# Patient Record
Sex: FEMALE | Race: WHITE | NOT HISPANIC OR LATINO | URBAN - METROPOLITAN AREA
[De-identification: names, ages, dates, MRNs, and addresses within clinical notes are randomized per-mention and may not be internally consistent; named-entity substitution may affect disease eponyms.]

---

## 2018-04-10 DIAGNOSIS — I82.409 DVT (DEEP VENOUS THROMBOSIS): Primary | ICD-10-CM

## 2018-04-18 DIAGNOSIS — M54.50 LOW BACK PAIN: Primary | ICD-10-CM

## 2018-04-18 DIAGNOSIS — M62.81 MUSCLE WEAKNESS (GENERALIZED): ICD-10-CM

## 2018-06-06 DIAGNOSIS — M54.50 CHRONIC MIDLINE LOW BACK PAIN: Primary | ICD-10-CM

## 2018-06-06 DIAGNOSIS — M62.81 MUSCLE WEAKNESS (GENERALIZED): ICD-10-CM

## 2018-06-06 DIAGNOSIS — G89.29 CHRONIC MIDLINE LOW BACK PAIN: Primary | ICD-10-CM

## 2018-06-25 ENCOUNTER — CLINICAL SUPPORT (OUTPATIENT)
Dept: REHABILITATION | Facility: HOSPITAL | Age: 73
End: 2018-06-25
Payer: COMMERCIAL

## 2018-06-25 DIAGNOSIS — Z91.81 RISK FOR FALLS: ICD-10-CM

## 2018-06-25 DIAGNOSIS — M54.50 CHRONIC BILATERAL LOW BACK PAIN WITHOUT SCIATICA: ICD-10-CM

## 2018-06-25 DIAGNOSIS — R29.898 DECONDITIONED LOW BACK: ICD-10-CM

## 2018-06-25 DIAGNOSIS — G89.29 CHRONIC BILATERAL LOW BACK PAIN WITHOUT SCIATICA: ICD-10-CM

## 2018-06-25 DIAGNOSIS — Z74.09 IMPAIRED FUNCTIONAL MOBILITY, BALANCE, GAIT, AND ENDURANCE: ICD-10-CM

## 2018-06-25 PROCEDURE — 97162 PT EVAL MOD COMPLEX 30 MIN: CPT | Mod: PN

## 2018-06-25 PROCEDURE — 97110 THERAPEUTIC EXERCISES: CPT | Mod: PN

## 2018-06-25 NOTE — PLAN OF CARE
[facility]  Physical Therapy Initial Evaluation    Name: Mary Pride  Clinic Number: [id number]    Therapy Diagnosis:   Encounter Diagnoses   Name Primary?    Impaired functional mobility, balance, gait, and endurance     Chronic bilateral low back pain without sciatica     Deconditioned low back     Risk for falls      Physician: Darby Mark MD    Physician Orders: PT Eval and Treat   Medical Diagnosis: Chronic LBP, muscle weakness  Evaluation Date: 6/25/2018  Authorization Period Expiration: 12/31/2018  Plan of Care Certification Period: 06/25/2018 to 08/19/2018  Visit # / Visits authorized: 1/50    Time In: 1700  Time Out: 1750  Total Billable Time: 50' minutes    Precautions: Standard and Fall    Subjective   Mary reports primary complaint of muscle weakness and 'having a hard time getting up and down'.  Reports that she has gotten weaker over the last 3 months due to multiple hospitalization since the new year.  This was at an OSH; no diagnosis available.  The patient states that the doctors could not find anything wrong with her except anemia.  Reports that she used to walk with SC but now has to use a rolling walker at all times.  However she states that she has not been walking due to the inability to stand up from her chair at home. She reports that she is able to drive but hasn't driven in 'several months'.   Does have a history of falls, the last one being 2 weeks ago.  Also reports dizziness with bending over which makes her fearful of falling.   Lives in a one-bedroom apartment.  Has her groceries delivered, has someone that cleans her house.  Able to dress herself.  Fearful of falling.  Reports inability to sleep; sleeps in a recliner due to LBP.   She presents today in a manual wheelchair and required assistance to transfer in/out of her family members car.        No past medical history on file. Reports orthostasis, B knee OA, chronic LBP, headaches, anemia  requiring transfusions  Mary Pride  has no past surgical history on file. Reports CECILIA Hernandez currently has no medications in their medication list.  See intake form.    Review of patient's allergies indicates:  Allergies not on file     Imaging: None available.    Prior Therapy: None  Social History:  lives alone in a ground level one-bedroom apartment.  Occupation: Works in the [employer] for [employer].  Prior Level of Function: Prior to January 1 she reports that she was walking with a SC.     Pain:  Current 7/10, worst 9/10, best 3/10   Location: bilateral back  and shoulder   Description: Deep and Sharp    Pts goals: To be able to walk and get in/out of her chair/car better so she can go back to work.  She is trying to achieve a 50 year [employer] kaur at her job.     Objective   Posture: Morbid obesity. Difficult to assess also due to inability.     Gross Movement Analysis:  - Gait:  Requires BUE support; trial of ambulation in the // bars.  Only able to walk 25' before requiring sitting rest break.   - Squats: NT  - Sit-to-Stand: Requires multi-attempts with min. Assist and BUE support of // bars. Leads with lumbar extension to generate the upward momentum to stand.  Decreas'd BLE control during return to sit.     Range of Motion:  Range of Motion:    Left Right   Shoulder Flexion P: 80    A: 80 P: 100     A:120   Shoulder abduction P: NT    A: NT P: NT     A: NT   Shoulder ER P: 30    A: 30 P: 40   A: 40   Shoulder IR P: 20    A: 20 P: 45    A: 45   Elbow WNL WNL   Wrist WNL WNL   L thumb trigger finger.      Upper Extremity Strength  UE Right Left   Shoulder flexion: 3-/5 3/5   Shoulder Abduction: NT NT   Shoulder ER 3+/5 3/5   Shoulder IR 3+/5 3-/5   Elbow flexion 3+/5 3+/5   Elbow extension 3+/5 3+/5   Wrist extension 3+/5 3+/5   Thumb extension 2/5 2/5   PADs/DABs 3/5 3/5     LE Right Left   Hip flexion 90 90   Hip abduction NT NT   Hip ER 30 WNL   Hip IR  30 30   Hip extension NT NT   Knee 0 - 115 0 - 115    Ankle DF 5 5     Lower Extremity Strength                 LE           Right           Left   Hip flexion: 3-/5 3-/5   Hip abduction 3 3   Hip extension 3 3   Hip ER 3 3   Knee flexion 3+ 3+   Knee extension 3 to 3+ 3 to 3+   Ankle dorsiflexion: 4 4   Ankle plantarflexion: 1/5 1/5     Special Tests: NT  Joint Mobility: significant L shoulder arthrofibrosis.  Sensation: intact light touch sensation to BLE throughout L2-S2 dermatomal pattern  Flexibility:  Tight hip flexors and gastrocnemius B.     CMS Impairment/Limitation/Restriction for FOTO Knee Survey    Therapist reviewed FOTO scores for Mary Pride on 6/25/2018.   FOTO documents entered into [employer] - see Media section.    Limitation Score: 62%  Predicted Score: 48%       TREATMENT   Treatment Time In: 1745  Treatment Time Out: 1800  Total Treatment time separate from Evaluation time:15'    Mary received therapeutic exercises to develop strength for 15 minutes with PT 1:1  DATE 6/25/2018   VISIT 1   FOTO 1/5   Cap Visit  Cap Total        Seated hip marching 2x10   Seated LAQs 2x10   Seated heel raises 2x10           INITIALS JH     Home Exercises and Patient Education Provided  Education provided re: performance of seated marching, seated knee extensions, and seated heel raises at home 10-20 per hour.  Written Home Exercises Provided: will provide at next session.    Mary demonstrated good  understanding of the education provided.     Assessment   Mary is a 73 y.o. female referred to outpatient Physical Therapy with a medical diagnosis of chronic LBP and muscle weakness. Pt does have chronic LBP but this is stable without an acute flare-up at this time.  She is however very debilitated due to a combination of reasons including multiple hospitalizations this year with a prior level of function that was a low MET level at baseline, morbid obesity, and multiple orthopaedic issues including B hip OA with history of L ANA, B shoulder RTC dysfunction, and  B knee OA making it difficult for her to perform functional transfers and community ambulation.  At baseline, her tolerance to activity and CVP capacity is low.  She is fearful of falling.  She also declines attempts from friends/family to aid her due to not wanting to burden them with her care.  She reports abilities to perform ADLs including bathing without issue, but based on her lack of shoulder mobility/AROM alone this seem unlikely.  Mrs. Butt would benefit greatly from CVP conditioning, transfer training, and BLE/BUE strengthening to improve her functional mobility and activity tolerance baseline.      Pt prognosis is Good.   Pt will benefit from skilled outpatient Physical Therapy to address the deficits stated above and in the chart below, provide pt/family education, and to maximize pt's level of independence.     Plan of care discussed with patient: Yes  Pt's spiritual, cultural and educational needs considered and patient is agreeable to the plan of care and goals as stated below:     Anticipated Barriers for therapy: mobility, low tolerance to activity, co-morbidities.    Medical Necessity is demonstrated by the following  History  Co-morbidities and personal factors that may impact the plan of care Co-morbidities:   high BMI, HTN and prior hip surgery    Personal Factors:   age  lifestyle  attitudes     moderate   Examination  Body Structures and Functions, activity limitations and participation restrictions that may impact the plan of care Body Regions:   back  lower extremities  upper extremities  trunk    Body Systems:    gross symmetry  ROM  strength  gross coordinated movement  balance  gait  transfers  transitions  motor control  motor learning  heart rate  respiratory rate  blood pressure  skin integrity    Participation Restrictions:   See above    Activity limitations:   Learning and applying knowledge  no deficits    General Tasks and Commands  no deficits    Communication  no  deficits    Mobility  lifting and carrying objects  walking  moving around using equipment (WC)    Self care  washing oneself (bathing, drying, washing hands)  caring for body parts (brushing teeth, shaving, grooming)  toileting  dressing  looking after one's health    Domestic Life  shopping  cooking  doing house work (cleaning house, washing dishes, laundry)    Interactions/Relationships  no deficits    Life Areas  no deficits    Community and Social Life  community life         high   Clinical Presentation evolving clinical presentation with changing clinical characteristics moderate   Decision Making/ Complexity Score: moderate     Goals:  Short Term Goals: 3-4 weeks   1.  Patient will demonstrate ability to stand from her manual wheelchair with standby assist to rollator without cueing.  2.  Patient will demonstrate ability to tolerate 8-10' of standing therex without requiring sitting rest break.  3.  Patient will demonstrate ability to ambulate 250' with rollator without rest with contact guard assist.    Long Term Goals: 8 weeks   1.  Patient will demonstrate ability to standing to rollator from standard chair with arm rests with independent.  2.  Patient will report <50% predicted score on lumbar FOTO survey.   3.  Patient will demonstrate to PT comprehensive understanding of each HEP component without verbal cueing.     Plan   Certification Period/Plan of care expiration: 06/25/2018 to 08/19/2018.    Outpatient Physical Therapy 3 times weekly for 8 weeks to include the following interventions: Gait Training, Manual Therapy, Moist Heat/ Ice, Neuromuscular Re-ed, Orthotic Management and Training, Patient Education, Self Care, Therapeutic Activites and Therapeutic Exercise and Modalities as indicated.     Heath Zambrano, PT

## 2018-07-31 ENCOUNTER — DOCUMENTATION ONLY (OUTPATIENT)
Dept: REHABILITATION | Facility: HOSPITAL | Age: 73
End: 2018-07-31

## 2018-07-31 ENCOUNTER — TELEPHONE (OUTPATIENT)
Dept: REHABILITATION | Facility: HOSPITAL | Age: 73
End: 2018-07-31

## 2018-07-31 PROBLEM — G89.29 CHRONIC BILATERAL LOW BACK PAIN WITHOUT SCIATICA: Status: RESOLVED | Noted: 2018-06-25 | Resolved: 2018-07-31

## 2018-07-31 PROBLEM — R29.898 DECONDITIONED LOW BACK: Status: RESOLVED | Noted: 2018-06-25 | Resolved: 2018-07-31

## 2018-07-31 PROBLEM — M54.50 CHRONIC BILATERAL LOW BACK PAIN WITHOUT SCIATICA: Status: RESOLVED | Noted: 2018-06-25 | Resolved: 2018-07-31

## 2018-07-31 PROBLEM — Z91.81 RISK FOR FALLS: Status: RESOLVED | Noted: 2018-06-25 | Resolved: 2018-07-31

## 2018-07-31 PROBLEM — Z74.09 IMPAIRED FUNCTIONAL MOBILITY, BALANCE, GAIT, AND ENDURANCE: Status: RESOLVED | Noted: 2018-06-25 | Resolved: 2018-07-31

## 2018-07-31 NOTE — PROGRESS NOTES
Pt was evaluated on 6/25/18 and was seen one time for PT. Pt has not attended PT since eval. Pt contacted and stated that she cant leave her home and she can't come to therapy now.  PT recommended she request  PT.  PT will d/c.      Mariaelena Mckeon DPT

## 2019-04-12 DIAGNOSIS — E55.9 VITAMIN D DEFICIENCY: Primary | ICD-10-CM

## 2019-04-12 DIAGNOSIS — Z00.00 ROUTINE HEALTH MAINTENANCE: Primary | ICD-10-CM
